# Patient Record
Sex: FEMALE | Race: WHITE | NOT HISPANIC OR LATINO | Employment: STUDENT | URBAN - NONMETROPOLITAN AREA
[De-identification: names, ages, dates, MRNs, and addresses within clinical notes are randomized per-mention and may not be internally consistent; named-entity substitution may affect disease eponyms.]

---

## 2023-07-07 DIAGNOSIS — F41.9 ANXIETY DISORDER, UNSPECIFIED: ICD-10-CM

## 2023-07-08 RX ORDER — ESCITALOPRAM OXALATE 10 MG/1
TABLET ORAL
Qty: 90 TABLET | Refills: 1 | OUTPATIENT
Start: 2023-07-08

## 2023-07-23 PROBLEM — R63.6 UNDERWEIGHT: Status: ACTIVE | Noted: 2023-07-23

## 2023-07-23 PROBLEM — J30.2 SEASONAL ALLERGIC RHINITIS: Status: ACTIVE | Noted: 2023-07-23

## 2023-07-23 PROBLEM — F63.3 TRICHOTILLOMANIA: Status: ACTIVE | Noted: 2023-07-23

## 2023-07-23 PROBLEM — N94.6 DYSMENORRHEA: Status: RESOLVED | Noted: 2023-07-23 | Resolved: 2023-07-23

## 2023-07-23 PROBLEM — J34.2 ACQUIRED DEVIATED NASAL SEPTUM: Status: ACTIVE | Noted: 2023-07-23

## 2023-07-23 PROBLEM — F41.9 ANXIETY: Status: ACTIVE | Noted: 2023-07-23

## 2023-07-23 PROBLEM — N93.9 ABNORMAL UTERINE BLEEDING (AUB): Status: RESOLVED | Noted: 2023-07-23 | Resolved: 2023-07-23

## 2023-07-23 PROBLEM — J30.9 ALLERGIC RHINITIS: Status: RESOLVED | Noted: 2023-07-23 | Resolved: 2023-07-23

## 2023-07-23 PROBLEM — R63.4 UNINTENTIONAL WEIGHT LOSS: Status: RESOLVED | Noted: 2023-07-23 | Resolved: 2023-07-23

## 2023-07-23 PROBLEM — R79.89 ELEVATED SERUM FREE T4 LEVEL: Status: ACTIVE | Noted: 2023-07-23

## 2023-07-23 RX ORDER — LORATADINE 10 MG/1
10 TABLET ORAL DAILY
Qty: 90 TABLET | Refills: 3 | OUTPATIENT
Start: 2023-07-23

## 2023-07-23 RX ORDER — LORATADINE 10 MG/1
10 TABLET ORAL DAILY
COMMUNITY
End: 2023-09-21

## 2023-07-23 RX ORDER — SODIUM FLUORIDE 5 MG/G
PASTE, DENTIFRICE ORAL
COMMUNITY
Start: 2023-07-07

## 2023-07-26 ENCOUNTER — OFFICE VISIT (OUTPATIENT)
Dept: PEDIATRICS | Facility: CLINIC | Age: 23
End: 2023-07-26
Payer: COMMERCIAL

## 2023-07-26 VITALS
HEART RATE: 92 BPM | DIASTOLIC BLOOD PRESSURE: 87 MMHG | HEIGHT: 60 IN | BODY MASS INDEX: 17.63 KG/M2 | SYSTOLIC BLOOD PRESSURE: 121 MMHG | OXYGEN SATURATION: 98 % | WEIGHT: 89.8 LBS

## 2023-07-26 DIAGNOSIS — R63.6 UNDERWEIGHT: ICD-10-CM

## 2023-07-26 DIAGNOSIS — F41.9 ANXIETY: ICD-10-CM

## 2023-07-26 DIAGNOSIS — Z00.121 ENCOUNTER FOR ROUTINE CHILD HEALTH EXAMINATION WITH ABNORMAL FINDINGS: Primary | ICD-10-CM

## 2023-07-26 DIAGNOSIS — J30.89 SEASONAL ALLERGIC RHINITIS DUE TO OTHER ALLERGIC TRIGGER: ICD-10-CM

## 2023-07-26 DIAGNOSIS — R30.0 DYSURIA: ICD-10-CM

## 2023-07-26 LAB
POC APPEARANCE, URINE: ABNORMAL
POC BILIRUBIN, URINE: ABNORMAL
POC BLOOD, URINE: ABNORMAL
POC COLOR, URINE: ABNORMAL
POC GLUCOSE, URINE: NEGATIVE MG/DL
POC KETONES, URINE: ABNORMAL MG/DL
POC LEUKOCYTES, URINE: ABNORMAL
POC NITRITE,URINE: NEGATIVE
POC PH, URINE: 5.5 PH
POC PROTEIN, URINE: ABNORMAL MG/DL
POC SPECIFIC GRAVITY, URINE: >=1.03
POC UROBILINOGEN, URINE: 0.2 EU/DL

## 2023-07-26 PROCEDURE — 87086 URINE CULTURE/COLONY COUNT: CPT

## 2023-07-26 PROCEDURE — 81003 URINALYSIS AUTO W/O SCOPE: CPT | Performed by: NURSE PRACTITIONER

## 2023-07-26 PROCEDURE — 99395 PREV VISIT EST AGE 18-39: CPT | Performed by: NURSE PRACTITIONER

## 2023-07-26 PROCEDURE — 96127 BRIEF EMOTIONAL/BEHAV ASSMT: CPT | Performed by: NURSE PRACTITIONER

## 2023-07-26 PROCEDURE — 1036F TOBACCO NON-USER: CPT | Performed by: NURSE PRACTITIONER

## 2023-07-26 PROCEDURE — 81001 URINALYSIS AUTO W/SCOPE: CPT

## 2023-07-26 RX ORDER — SULFAMETHOXAZOLE AND TRIMETHOPRIM 800; 160 MG/1; MG/1
1 TABLET ORAL 2 TIMES DAILY
Qty: 20 TABLET | Refills: 0 | Status: SHIPPED | OUTPATIENT
Start: 2023-07-26 | End: 2023-08-05

## 2023-07-26 RX ORDER — ESCITALOPRAM OXALATE 10 MG/1
10 TABLET ORAL DAILY
Qty: 90 TABLET | Refills: 1 | OUTPATIENT
Start: 2023-07-26

## 2023-07-26 RX ORDER — ESCITALOPRAM OXALATE 10 MG/1
10 TABLET ORAL DAILY
Qty: 30 TABLET | Refills: 5 | Status: SHIPPED | OUTPATIENT
Start: 2023-07-26 | End: 2023-09-25 | Stop reason: SDUPTHER

## 2023-07-26 RX ORDER — KETOCONAZOLE 20 MG/ML
1 SHAMPOO, SUSPENSION TOPICAL
COMMUNITY
Start: 2019-10-08

## 2023-07-26 SDOH — HEALTH STABILITY: MENTAL HEALTH: RISK FACTORS RELATED TO TOBACCO: 0

## 2023-07-26 SDOH — HEALTH STABILITY: MENTAL HEALTH: RISK FACTORS RELATED TO DRUGS: 0

## 2023-07-26 SDOH — HEALTH STABILITY: MENTAL HEALTH: SMOKING IN HOME: 0

## 2023-07-26 ASSESSMENT — ENCOUNTER SYMPTOMS
DIARRHEA: 1
CONSTIPATION: 1
SLEEP DISTURBANCE: 0
SNORING: 0

## 2023-07-26 NOTE — PROGRESS NOTES
Subjective   History was provided by the  alone .  Pari Dill is a 22 y.o. female who is here for this well child visit.  Immunization History   Administered Date(s) Administered    DTaP vaccine, pediatric  (INFANRIX) 10/10/2005    DTaP vaccine, pediatric (DAPTACEL) 03/05/2002    DTaP, Unspecified 2001, 04/09/2002, 01/06/2003    Flu vaccine (IIV4), preservative free *Check age/dose* 10/05/2016, 09/25/2021, 10/16/2022    HPV, Quadrivalent 11/07/2013, 10/23/2017    Hep A, Unspecified 09/07/2006, 04/02/2007    Hepatitis B vaccine, pediatric/adolescent (RECOMBIVAX, ENGERIX) 2001, 2001, 04/09/2002    HiB, unspecified 2001, 02/19/2002, 04/09/2002, 01/06/2003    Influenza, Unspecified 10/27/2010, 12/27/2010, 11/01/2011, 11/01/2012    Influenza, injectable, quadrivalent 10/23/2017, 09/26/2018, 09/16/2020    Influenza, live, intranasal 10/10/2013, 10/09/2015    Influenza, live, intranasal, quadrivalent 10/01/2014    MMR vaccine, subcutaneous (MMR II) 10/10/2002, 10/09/2003    Meningococcal ACWY vaccine (MENVEO) 11/01/2012, 01/18/2018    Meningococcal B vaccine (BEXSERO) 01/18/2018, 02/22/2018    Moderna SARS-CoV-2 Vaccination 03/27/2021, 04/24/2021, 04/07/2022    Pneumococcal conjugate vaccine, 13-valent (PREVNAR 13) 01/18/2002, 04/09/2002, 01/06/2003    Poliovirus vaccine, subcutaneous (IPOL) 2001, 02/19/2002, 01/06/2003, 10/10/2005    Tdap vaccine, age 10 years and older (BOOSTRIX) 11/01/2011, 07/27/2022    Varicella vaccine, subcutaneous (VARIVAX) 10/10/2002, 10/26/2009     History of previous adverse reactions to immunizations? no  The following portions of the patient's history were reviewed by a provider in this encounter and updated as appropriate:  Tobacco  Allergies  Meds  Problems  Soc Hx      Well Child Assessment:  Pari lives with her mother and father.   Nutrition  Types of intake include cereals, cow's milk, eggs, meats, vegetables and fruits.   Dental  The patient has a  "dental home. The patient brushes teeth regularly. The patient flosses regularly. Last dental exam was less than 6 months ago.   Elimination  Elimination problems include constipation, diarrhea and urinary symptoms.   Sleep  The patient does not snore. There are no sleep problems.   Safety  There is no smoking in the home. Home has working smoke alarms? yes. Home has working carbon monoxide alarms? yes. There is a gun in home (locked up).   School  Current school district is [employer]. Child is doing well in school.   Screening  There are no risk factors for sexually transmitted infections. There are no risk factors related to alcohol. There are no risk factors related to drugs. There are no risk factors related to tobacco.   Social  After school activity: will be working at [employer].       Objective   Vitals:    07/26/23 1047   BP: 121/87   Pulse: 92   SpO2: 98%   Weight: (!) 40.7 kg (89 lb 12.8 oz)   Height: 1.536 m (5' 0.47\")     Growth parameters are noted and are appropriate for age.  Physical Exam  Vitals and nursing note reviewed. Exam conducted with a chaperone present.   Constitutional:       General: She is not in acute distress.     Appearance: Normal appearance. She is normal weight.   HENT:      Head: Normocephalic.      Right Ear: Tympanic membrane and ear canal normal.      Left Ear: Tympanic membrane and ear canal normal.      Nose: Nose normal.      Mouth/Throat:      Mouth: Mucous membranes are moist.      Pharynx: Oropharynx is clear.   Eyes:      Conjunctiva/sclera: Conjunctivae normal.      Pupils: Pupils are equal, round, and reactive to light.   Cardiovascular:      Rate and Rhythm: Normal rate and regular rhythm.      Heart sounds: No murmur heard.  Pulmonary:      Effort: Pulmonary effort is normal. No respiratory distress.      Breath sounds: Normal breath sounds.   Abdominal:      General: Abdomen is flat. Bowel sounds are normal.      Palpations: Abdomen is " soft.   Musculoskeletal:         General: Normal range of motion.      Cervical back: Normal range of motion.   Skin:     General: Skin is warm and dry.      Findings: No rash.   Neurological:      Mental Status: She is alert and oriented to person, place, and time.   Psychiatric:         Mood and Affect: Mood normal.         Behavior: Behavior normal.         Assessment/Plan   Well adolescent. Can continue lexapro. Depression screen normal. Started on Bactrim for UTI - will call with results.  1. Anticipatory guidance discussed. Discussed transition to adult med in the next 6 mos.  Gave handout on well-child issues at this age.  2.  Weight management:  The patient was counseled regarding nutrition and physical activity.  3. Development: appropriate for age  4.   Orders Placed This Encounter   Procedures    Urine culture    Lipid Panel Non-Fasting    Urinalysis with Reflex Microscopic    POCT UA Automated manually resulted     5. Follow-up visit in 6 months for next well child visit, or sooner as needed.

## 2023-07-27 LAB
APPEARANCE, URINE: ABNORMAL
BILIRUBIN, URINE: NEGATIVE
BLOOD, URINE: ABNORMAL
COLOR, URINE: YELLOW
GLUCOSE, URINE: NEGATIVE MG/DL
KETONES, URINE: ABNORMAL MG/DL
LEUKOCYTE ESTERASE, URINE: ABNORMAL
NITRITE, URINE: NEGATIVE
PH, URINE: 5 (ref 5–8)
PROTEIN, URINE: ABNORMAL MG/DL
RBC, URINE: 20 /HPF (ref 0–5)
SPECIFIC GRAVITY, URINE: 1.03 (ref 1–1.03)
SQUAMOUS EPITHELIAL CELLS, URINE: 4 /HPF
UROBILINOGEN, URINE: <2 MG/DL (ref 0–1.9)
WBC, URINE: 19 /HPF (ref 0–5)

## 2023-07-28 LAB — URINE CULTURE: NORMAL

## 2023-07-31 NOTE — TELEPHONE ENCOUNTER
Patient states that she is having side effects from the medicine. She said she has a fast heart rate and a headache. She was wanting some advice. She was also wondering if she could be switched to a different medicine. Patient states she did go to the ER.   The preferred pharmacy is [facility]

## 2023-07-31 NOTE — TELEPHONE ENCOUNTER
Spoke with patient - states she is feeling better. She was in the ER  for increased heartrate a couple days ago.  Thinks she might have BV - right lower abd pain is improving.  Advised can stop bactrim as UTI neg.  Needs to call GYN to review results- treatment for BV. She reports understanding.

## 2023-09-25 DIAGNOSIS — F41.9 ANXIETY: ICD-10-CM

## 2023-09-25 RX ORDER — ESCITALOPRAM OXALATE 10 MG/1
10 TABLET ORAL DAILY
Qty: 90 TABLET | Refills: 0 | Status: SHIPPED | OUTPATIENT
Start: 2023-09-25 | End: 2024-02-14

## 2023-11-30 DIAGNOSIS — K21.9 GASTROESOPHAGEAL REFLUX DISEASE, UNSPECIFIED WHETHER ESOPHAGITIS PRESENT: Primary | ICD-10-CM

## 2023-11-30 DIAGNOSIS — J30.2 SEASONAL ALLERGIC RHINITIS, UNSPECIFIED TRIGGER: ICD-10-CM

## 2023-11-30 RX ORDER — LORATADINE 10 MG/1
10 TABLET ORAL DAILY
Qty: 90 TABLET | Refills: 2 | Status: SHIPPED | OUTPATIENT
Start: 2023-11-30

## 2023-12-05 RX ORDER — PANTOPRAZOLE SODIUM 20 MG/1
20 TABLET, DELAYED RELEASE ORAL
Qty: 30 TABLET | Refills: 0 | Status: SHIPPED | OUTPATIENT
Start: 2023-12-05 | End: 2024-02-23 | Stop reason: SDUPTHER

## 2024-01-20 DIAGNOSIS — K21.9 GASTROESOPHAGEAL REFLUX DISEASE, UNSPECIFIED WHETHER ESOPHAGITIS PRESENT: ICD-10-CM

## 2024-01-22 RX ORDER — PANTOPRAZOLE SODIUM 20 MG/1
TABLET, DELAYED RELEASE ORAL
Qty: 30 TABLET | Refills: 0 | OUTPATIENT
Start: 2024-01-22

## 2024-02-13 DIAGNOSIS — K21.9 GASTROESOPHAGEAL REFLUX DISEASE, UNSPECIFIED WHETHER ESOPHAGITIS PRESENT: ICD-10-CM

## 2024-02-16 RX ORDER — PANTOPRAZOLE SODIUM 20 MG/1
TABLET, DELAYED RELEASE ORAL
Qty: 30 TABLET | Refills: 0 | OUTPATIENT
Start: 2024-02-16

## 2024-02-21 NOTE — PROGRESS NOTES
History Of Present Illness  Pari Dill is a 23 y.o. female presenting with a chief complaint of Follow-up (Pt here for rx refill. She goes on to state the Pantoprazole has been working. ).    She initially presented to GI clinic for evaluation of unintentional weight loss.  Completed EGD with mild reactive gastritis and gastropathy, started patient on pantoprazole daily and her bloating abdominal pain resolved.  She has loss of appetite and bloating when not taking pantoprazole.    No longer taking Metamucil, has a bm a few days per week. Formed, soft.  Notes that if she doesn't take PPI food will run right through her.     Toast/banana bread, Pizzas and grinders at [employer], ice cream    Takes MV every once in awhile    Social History  She reports that she has never smoked. She has never used smokeless tobacco. She reports that she does not currently use alcohol. She reports that she does not currently use drugs after having used the following drugs: Marijuana.  She does not take NSAIDs on a regular basis  Starting next month at [facility] in [place] for nails    Family History  Family History   Problem Relation Name Age of Onset    Other (bladder cancer) Father      Lung cancer Maternal Grandmother      Lung cancer Maternal Grandfather      Heart disease Paternal Grandfather       The patient does not have a FH of CRC. she does not have a FH of IBD    Review of Systems   Constitutional:  Negative for appetite change, chills, diaphoresis, fatigue, fever and unexpected weight change.   HENT:  Negative for trouble swallowing.    Gastrointestinal:  Negative for abdominal distention, abdominal pain, anal bleeding, blood in stool, constipation, diarrhea, nausea, rectal pain and vomiting.        See HPI   All other systems reviewed and are negative.        Physical Exam  Constitutional:       Appearance: She is underweight.   HENT:      Head: Normocephalic and atraumatic.   Eyes:      Conjunctiva/sclera: Conjunctivae  "normal.      Pupils: Pupils are equal, round, and reactive to light.   Pulmonary:      Effort: Pulmonary effort is normal.   Abdominal:      General: Bowel sounds are normal. There is no distension.      Palpations: Abdomen is soft. There is no mass.      Tenderness: There is no abdominal tenderness. There is no guarding or rebound.      Hernia: No hernia is present.   Musculoskeletal:         General: Normal range of motion.      Cervical back: Normal range of motion.   Skin:     General: Skin is warm and dry.      Coloration: Skin is not jaundiced.   Neurological:      Mental Status: She is alert and oriented to person, place, and time. Mental status is at baseline.   Psychiatric:         Mood and Affect: Mood normal.         Behavior: Behavior normal.          Last Vital Signs  /74 (BP Location: Right arm, Patient Position: Sitting, BP Cuff Size: Adult)   Pulse 88   Temp 36.6 °C (97.9 °F) (Temporal)   Resp 15   Ht 1.549 m (5' 1\")   Wt (!) 37.6 kg (82 lb 12.8 oz)   SpO2 97%   BMI 15.64 kg/m²      Relevant Results  Lab Results   Component Value Date    WBC 7.2 11/05/2022    HGB 14.6 11/05/2022    HCT 44.2 11/05/2022    MCV 91 11/05/2022     11/05/2022      Lab Results   Component Value Date    GLUCOSE 83 11/05/2022    CALCIUM 9.4 11/05/2022     11/05/2022    K 3.7 11/05/2022    CO2 28 11/05/2022     11/05/2022    BUN 8 11/05/2022    CREATININE 0.69 11/05/2022      Lab Results   Component Value Date    ALT 9 11/05/2022    AST 13 11/05/2022    ALKPHOS 49 11/05/2022    BILITOT 0.5 11/05/2022      Lab Results   Component Value Date    [id number] 655 11/05/2022       Lab Results   Component Value Date    CRP <0.10 11/05/2022      Mesenteric US 2/10/2023-   CONCLUSIONS:  Mesenteric: The celiac, hepatic, splenic and SMA appear widely patent with no evidence of stenosis. The patient was NPO for this study.     EGD December 2022-mild reactive gastritis and gastropathy, duodenal biopsies " within normal limits   Colonoscopy never    Assessment/Plan   23 y.o. female presenting to GI clinic for follow up and med refill. Abdominal pain and bloating resolved after patient started PPI therapy.  She is still only 82 pounds with overall unremarkable EGD and negative mesenteric ultrasound.     Reached out to Dr. Guillen via Epic messaging to see if further workup with colonoscopy or breath testing would be indicated, awaiting response  Continue PPI daily    Problem List Items Addressed This Visit       Underweight - Primary     Other Visit Diagnoses       Gastroesophageal reflux disease, unspecified whether esophagitis present        Relevant Medications    pantoprazole (ProtoNix) 20 mg EC tablet            Radha Rizzo, APRN-CNP

## 2024-02-23 ENCOUNTER — OFFICE VISIT (OUTPATIENT)
Dept: GASTROENTEROLOGY | Facility: CLINIC | Age: 24
End: 2024-02-23
Payer: COMMERCIAL

## 2024-02-23 VITALS
HEART RATE: 88 BPM | WEIGHT: 82.8 LBS | HEIGHT: 61 IN | SYSTOLIC BLOOD PRESSURE: 111 MMHG | BODY MASS INDEX: 15.63 KG/M2 | DIASTOLIC BLOOD PRESSURE: 74 MMHG | TEMPERATURE: 97.9 F | RESPIRATION RATE: 15 BRPM | OXYGEN SATURATION: 97 %

## 2024-02-23 DIAGNOSIS — R63.6 UNDERWEIGHT: Primary | ICD-10-CM

## 2024-02-23 DIAGNOSIS — K21.9 GASTROESOPHAGEAL REFLUX DISEASE, UNSPECIFIED WHETHER ESOPHAGITIS PRESENT: ICD-10-CM

## 2024-02-23 PROCEDURE — 99214 OFFICE O/P EST MOD 30 MIN: CPT

## 2024-02-23 PROCEDURE — 1036F TOBACCO NON-USER: CPT

## 2024-02-23 RX ORDER — PANTOPRAZOLE SODIUM 20 MG/1
20 TABLET, DELAYED RELEASE ORAL
Qty: 90 TABLET | Refills: 3 | Status: SHIPPED | OUTPATIENT
Start: 2024-02-23 | End: 2024-05-31

## 2024-02-23 ASSESSMENT — ENCOUNTER SYMPTOMS
DIAPHORESIS: 0
OCCASIONAL FEELINGS OF UNSTEADINESS: 0
ANAL BLEEDING: 0
ABDOMINAL PAIN: 0
BLOOD IN STOOL: 0
NAUSEA: 0
FATIGUE: 0
CHILLS: 0
FEVER: 0
CONSTIPATION: 0
ABDOMINAL DISTENTION: 0
RECTAL PAIN: 0
UNEXPECTED WEIGHT CHANGE: 0
TROUBLE SWALLOWING: 0
DEPRESSION: 0
LOSS OF SENSATION IN FEET: 0
DIARRHEA: 0
ROS GI COMMENTS: SEE HPI
APPETITE CHANGE: 0
VOMITING: 0

## 2024-02-23 ASSESSMENT — PAIN SCALES - GENERAL: PAINLEVEL: 0-NO PAIN

## 2024-02-27 DIAGNOSIS — R63.6 UNDERWEIGHT: Primary | ICD-10-CM

## 2024-03-05 ENCOUNTER — LAB (OUTPATIENT)
Dept: LAB | Facility: LAB | Age: 24
End: 2024-03-05
Payer: COMMERCIAL

## 2024-03-05 DIAGNOSIS — R63.6 UNDERWEIGHT: ICD-10-CM

## 2024-03-29 ENCOUNTER — LAB (OUTPATIENT)
Dept: LAB | Facility: LAB | Age: 24
End: 2024-03-29
Payer: COMMERCIAL

## 2024-03-29 PROCEDURE — 83993 ASSAY FOR CALPROTECTIN FECAL: CPT

## 2024-03-29 PROCEDURE — 82653 EL-1 FECAL QUANTITATIVE: CPT

## 2024-04-02 LAB — ELASTASE PANC STL-MCNT: >800 UG/G

## 2024-04-06 LAB — CALPROTECTIN STL-MCNT: 80 UG/G

## 2024-05-29 NOTE — PROGRESS NOTES
"History Of Present Illness  Pari Dill is a 23 y.o. female presenting to GI clinic for follow up GERD, low BMI.    Pt states she is doing \"pretty good\" since last visit. Abd pain and bloating has subsided but Pt now complains of constipation.  She is having a BM once a day or every other day.  She was taking MOM without success. Drinking 30 ounces water daily.  Her diet has not changed.  She has not seen the dietitian yet.    She notes that her highest weight was 96 lbs 4-5 years ago. She has gained 3 pounds since her last visit.       Social History  She reports that she has never smoked. She has never used smokeless tobacco. She reports that she does not currently use alcohol. She reports that she does not currently use drugs after having used the following drugs: Marijuana.  She does not take NSAIDs on a regular basis    Family History  Family History   Problem Relation Name Age of Onset    Other (bladder cancer) Father      Hypertension Father      Lung cancer Maternal Grandmother      Lung cancer Maternal Grandfather      Heart disease Paternal Grandfather       The patient does not have a FH of CRC. she does not have a FH of IBD    Review of Systems   Gastrointestinal:  Positive for constipation.        See HPI   All other systems reviewed and are negative.        Physical Exam  Constitutional:       Appearance: She is underweight.   HENT:      Head: Normocephalic and atraumatic.   Eyes:      Conjunctiva/sclera: Conjunctivae normal.      Pupils: Pupils are equal, round, and reactive to light.   Pulmonary:      Effort: Pulmonary effort is normal.   Abdominal:      General: Bowel sounds are normal. There is no distension.      Palpations: Abdomen is soft. There is no mass.      Tenderness: There is no abdominal tenderness. There is no guarding or rebound.      Hernia: No hernia is present.   Musculoskeletal:         General: Normal range of motion.      Cervical back: Normal range of motion.   Skin:     " "General: Skin is warm and dry.      Coloration: Skin is not jaundiced.   Neurological:      Mental Status: She is alert and oriented to person, place, and time. Mental status is at baseline.   Psychiatric:         Mood and Affect: Mood normal.         Behavior: Behavior normal.          Last Vital Signs  BP 98/66 (BP Location: Left arm, Patient Position: Sitting, BP Cuff Size: Adult)   Pulse 86   Temp 36.3 °C (97.3 °F) (Temporal)   Resp 15   Ht 1.549 m (5' 1\")   Wt (!) 38.7 kg (85 lb 4.8 oz)   SpO2 100%   BMI 16.12 kg/m²      Relevant Results  Lab Results   Component Value Date    WBC 7.2 11/05/2022    HGB 14.6 11/05/2022    HCT 44.2 11/05/2022    MCV 91 11/05/2022     11/05/2022      Lab Results   Component Value Date    GLUCOSE 83 11/05/2022    CALCIUM 9.4 11/05/2022     11/05/2022    K 3.7 11/05/2022    CO2 28 11/05/2022     11/05/2022    BUN 8 11/05/2022    CREATININE 0.69 11/05/2022      Lab Results   Component Value Date    ALT 9 11/05/2022    AST 13 11/05/2022    ALKPHOS 49 11/05/2022    BILITOT 0.5 11/05/2022      Lab Results   Component Value Date    [id number] 655 11/05/2022       Lab Results   Component Value Date    CALPS 80 (H) 03/29/2024    CRP <0.10 11/05/2022      No results found for: \"LIPASE\"   Lab Results   Component Value Date    HGBA1C 4.5 (L) 05/29/2024         Latest Reference Range & Units 03/29/24 14:15   Pancreatic Elastase, Fecal >=100 ug/g >800   Calprotectin, Stool <=49 ug/g 80 (H)   (H): Data is abnormally high    EGD/COLONOSCOPY  EGD December 2022-mild reactive gastritis and gastropathy, duodenal biopsies within normal limits   Colonoscopy never    IMAGING  Mesenteric US 2/10/2023-   CONCLUSIONS:  Mesenteric: The celiac, hepatic, splenic and SMA appear widely patent with no evidence of stenosis. The patient was NPO for this study.    === 01/25/23 ===  US ABDOMEN COMPLETE  - Impression -  No acute abdominal pathology is demonstrated.     Assessment/Plan   23 " y.o. female presenting to GI clinic for follow-up GERD, constipation, low BMI.  Her GERD symptoms are well-controlled on PPI daily.  She is having some trouble with constipation.  Her fecal calprotectin was mildly borderline elevated, pancreatic elastase was WNL.  She has gained approximately 3 pounds since our last visit.  We discussed repeating fecal calprotectin versus completing colonoscopy for further evaluation of her low BMI.    She wants to hold off on further testing right now. Encouraged her to follow up for colonoscopy if further weight loss   Since she has had success with Metamucil in the past, recommend restarting Metamucil daily.  Continue pantoprazole once daily  Follow-up yearly and as needed    Problem List Items Addressed This Visit       Underweight     Other Visit Diagnoses       Gastroesophageal reflux disease, unspecified whether esophagitis present    -  Primary    Relevant Medications    pantoprazole (ProtoNix) 20 mg EC tablet    Constipation, unspecified constipation type        Relevant Medications    psyllium husk (MetamuciL) 3.4 gram/5.4 gram powder            LAZARA Reyes-CNP

## 2024-05-31 ENCOUNTER — OFFICE VISIT (OUTPATIENT)
Dept: GASTROENTEROLOGY | Facility: CLINIC | Age: 24
End: 2024-05-31
Payer: COMMERCIAL

## 2024-05-31 VITALS
BODY MASS INDEX: 16.1 KG/M2 | SYSTOLIC BLOOD PRESSURE: 98 MMHG | HEIGHT: 61 IN | DIASTOLIC BLOOD PRESSURE: 66 MMHG | TEMPERATURE: 97.3 F | WEIGHT: 85.3 LBS | RESPIRATION RATE: 15 BRPM | HEART RATE: 86 BPM | OXYGEN SATURATION: 100 %

## 2024-05-31 DIAGNOSIS — K21.9 GASTROESOPHAGEAL REFLUX DISEASE, UNSPECIFIED WHETHER ESOPHAGITIS PRESENT: Primary | ICD-10-CM

## 2024-05-31 DIAGNOSIS — R63.6 UNDERWEIGHT: ICD-10-CM

## 2024-05-31 DIAGNOSIS — K59.00 CONSTIPATION, UNSPECIFIED CONSTIPATION TYPE: ICD-10-CM

## 2024-05-31 PROCEDURE — 1036F TOBACCO NON-USER: CPT

## 2024-05-31 PROCEDURE — 99214 OFFICE O/P EST MOD 30 MIN: CPT

## 2024-05-31 RX ORDER — CETIRIZINE HYDROCHLORIDE 10 MG/1
10 TABLET ORAL DAILY
COMMUNITY
End: 2024-05-31 | Stop reason: ALTCHOICE

## 2024-05-31 RX ORDER — PANTOPRAZOLE SODIUM 20 MG/1
20 TABLET, DELAYED RELEASE ORAL
Qty: 90 TABLET | Refills: 3 | Status: SHIPPED | OUTPATIENT
Start: 2024-05-31 | End: 2025-05-31

## 2024-05-31 RX ORDER — PSYLLIUM SEED
PACKET (EA) ORAL
Qty: 660 G | Refills: 3 | Status: SHIPPED | OUTPATIENT
Start: 2024-05-31

## 2024-05-31 RX ORDER — MULTIVITAMIN
1 TABLET ORAL DAILY
COMMUNITY

## 2024-05-31 ASSESSMENT — PAIN SCALES - GENERAL: PAINLEVEL: 0-NO PAIN

## 2024-05-31 ASSESSMENT — ENCOUNTER SYMPTOMS
CONSTIPATION: 1
ROS GI COMMENTS: SEE HPI

## 2024-05-31 NOTE — PATIENT INSTRUCTIONS
See dietitian  Call if you have further weight loss or would like to complete repeat stool test or colonoscopy  Follow up yearly and as needed    FIBER  Fiber is a type of carbohydrate. Carbohydrates are normally converted by the body into sugar, but fiber passes through the body undigested. This might sound bad, but it is actually good!     Fiber, along with adequate fluid intake, keeps your digestive tract running smoothly. Fiber helps GI issues like diverticular disease, hemorrhoids, and constipation. It also appears to lower the risk of developing various conditions, including heart disease, diabetes, high blood pressure, obesity, stroke, and colon cancer. A high-fiber diet also keeps you feeling olea longer, which is linked to lower body weight.     There are two types of fiber: soluble and insoluble.   Soluble fiber helps ease the movement of waste in your digestive tract by binding to water, creating a gel that helps to soften and bulk stool.    Insoluble fiber stimulates the secretion of water and mucus in the bowel to encourage movement of stool.     Try to eat 25-38g of fiber daily. Estimates say only 5 percent of Americans meet their daily fiber requirements. It is important to get both types of fiber in your diet.    When you increase fiber, drink more water so the fiber can work properly and do it slowly over a few weeks. Bloating and discomfort may occur or get worse before it improves while the body is adjusting over the first few weeks after increasing fiber intake.    Start fiber supplement psyllium (brands- Metamucil, [place], store brand) daily. Start with one teaspoon per day for one week, then increase to two teaspoons per day the next week, then increase to one tablespoon daily.  Mix fiber in at least 8 ounces of water or another uncarbonated beverage. Psyllium is a soluble fiber, meaning it becomes gelatinous in water. Drink mixture right away to avoid a thick texture.     Here is some more  information about fiber and foods that are high in fiber  [facility] Nutrition for Gut Health- fiber  [email or web address]

## 2024-09-03 ENCOUNTER — APPOINTMENT (OUTPATIENT)
Dept: OBSTETRICS AND GYNECOLOGY | Facility: CLINIC | Age: 24
End: 2024-09-03
Payer: COMMERCIAL

## 2024-10-15 ENCOUNTER — APPOINTMENT (OUTPATIENT)
Dept: OBSTETRICS AND GYNECOLOGY | Facility: CLINIC | Age: 24
End: 2024-10-15
Payer: COMMERCIAL

## 2024-11-08 ENCOUNTER — APPOINTMENT (OUTPATIENT)
Dept: OBSTETRICS AND GYNECOLOGY | Facility: CLINIC | Age: 24
End: 2024-11-08
Payer: COMMERCIAL

## 2024-12-06 ENCOUNTER — APPOINTMENT (OUTPATIENT)
Dept: OBSTETRICS AND GYNECOLOGY | Facility: CLINIC | Age: 24
End: 2024-12-06
Payer: COMMERCIAL